# Patient Record
Sex: FEMALE | Race: WHITE | URBAN - METROPOLITAN AREA
[De-identification: names, ages, dates, MRNs, and addresses within clinical notes are randomized per-mention and may not be internally consistent; named-entity substitution may affect disease eponyms.]

---

## 2021-01-27 ENCOUNTER — TELEPHONE ENCOUNTER (OUTPATIENT)
Dept: URBAN - METROPOLITAN AREA CLINIC 114 | Facility: CLINIC | Age: 51
End: 2021-01-27

## 2021-01-28 ENCOUNTER — OFFICE VISIT (OUTPATIENT)
Dept: URBAN - METROPOLITAN AREA CLINIC 114 | Facility: CLINIC | Age: 51
End: 2021-01-28
Payer: COMMERCIAL

## 2021-01-28 DIAGNOSIS — Z98.890 HISTORY OF BILLROTH I OPERATION: ICD-10-CM

## 2021-01-28 DIAGNOSIS — Z87.11 PERSONAL HISTORY OF PEPTIC ULCER DISEASE: ICD-10-CM

## 2021-01-28 DIAGNOSIS — K21.00 GASTROESOPHAGEAL REFLUX DISEASE WITH ESOPHAGITIS WITHOUT HEMORRHAGE: ICD-10-CM

## 2021-01-28 DIAGNOSIS — R19.7 DIARRHEA, UNSPECIFIED TYPE: ICD-10-CM

## 2021-01-28 DIAGNOSIS — K83.8 DILATED CBD, ACQUIRED: ICD-10-CM

## 2021-01-28 DIAGNOSIS — K63.89 SMALL INTESTINAL BACTERIAL OVERGROWTH (SIBO): ICD-10-CM

## 2021-01-28 PROCEDURE — G9902 PT SCRN TBCO AND ID AS USER: HCPCS | Performed by: INTERNAL MEDICINE

## 2021-01-28 PROCEDURE — G8483 FLU IMM NO ADMIN DOC REA: HCPCS | Performed by: INTERNAL MEDICINE

## 2021-01-28 PROCEDURE — G8420 CALC BMI NORM PARAMETERS: HCPCS | Performed by: INTERNAL MEDICINE

## 2021-01-28 PROCEDURE — G9906 PT RECV TBCO CESS INTERV: HCPCS | Performed by: INTERNAL MEDICINE

## 2021-01-28 PROCEDURE — 4004F PT TOBACCO SCREEN RCVD TLK: CPT | Performed by: INTERNAL MEDICINE

## 2021-01-28 PROCEDURE — 3017F COLORECTAL CA SCREEN DOC REV: CPT | Performed by: INTERNAL MEDICINE

## 2021-01-28 PROCEDURE — 99214 OFFICE O/P EST MOD 30 MIN: CPT | Performed by: INTERNAL MEDICINE

## 2021-01-28 RX ORDER — RIFAXIMIN 550 MG/1
1 TABLET TABLET ORAL THREE TIMES A DAY
Qty: 42 TABLET | Refills: 0 | OUTPATIENT
Start: 2021-01-28 | End: 2021-02-11

## 2021-01-28 RX ORDER — SUCRALFATE 1 G/1
1 TABLET ON AN EMPTY STOMACH TABLET ORAL TWICE A DAY
Qty: 180 TABLET | Refills: 3
Start: 2017-08-30 | End: 2018-08-25

## 2021-01-28 RX ORDER — PANTOPRAZOLE SODIUM 40 MG/1
TAKE 1 TABLET BY MOUTH  TWICE A DAY TABLET, DELAYED RELEASE ORAL
Qty: 180 | Refills: 2 | Status: ACTIVE | COMMUNITY
Start: 2019-02-23

## 2021-01-28 RX ORDER — SUCRALFATE 1 G/1
TAKE 1 TABLET BY MOUTH  PRIOR TO MEALS TABLET ORAL
Qty: 270 | Refills: 3 | Status: ON HOLD | COMMUNITY
Start: 2019-02-23

## 2021-01-28 RX ORDER — METOCLOPRAMIDE HYDROCHLORIDE 5 MG/1
TID PRN FOR NAUSEA AND VOMITING TABLET ORAL TWICE A DAY
Qty: 60 TABLET | Refills: 0
Start: 2017-06-22

## 2021-01-28 RX ORDER — PANTOPRAZOLE SODIUM 40 MG/1
1 TABLET TABLET, DELAYED RELEASE ORAL ONCE A DAY
Qty: 90 TABLET | Refills: 4
Start: 2019-02-23

## 2021-01-28 RX ORDER — METOCLOPRAMIDE HYDROCHLORIDE 5 MG/1
TID PRN FOR NAUSEA AND VOMITING TABLET ORAL
Qty: 60 | Refills: 0 | Status: ACTIVE | COMMUNITY
Start: 2017-06-22

## 2021-01-28 RX ORDER — ONDANSETRON HYDROCHLORIDE 8 MG/1
HALF TO 1 TABLET AS NEEDED TABLET, FILM COATED ORAL ONCE A DAY
Qty: 28 TABLET | Refills: 1 | OUTPATIENT

## 2021-01-28 RX ORDER — SUCRALFATE 1 G/1
1 TAB PRIOR TO MEALS THREE TIMES A DAY TABLET ORAL
Qty: 270 | Refills: 4 | Status: ACTIVE | COMMUNITY
Start: 2017-08-30

## 2021-02-05 LAB
A/G RATIO: 1.7
ALBUMIN: 4.5
ALKALINE PHOSPHATASE: 71
ALT (SGPT): 8
AST (SGOT): 20
BASO (ABSOLUTE): 0.1
BASOS: 1
BILIRUBIN, TOTAL: <0.2
BUN/CREATININE RATIO: 28
BUN: 29
CALCIUM: 9.4
CARBON DIOXIDE, TOTAL: 23
CHLORIDE: 99
CHOLESTEROL, TOTAL: 160
COMMENT:: (no result)
CREATININE: 1.05
EGFR IF AFRICN AM: 71
EGFR IF NONAFRICN AM: 61
EOS (ABSOLUTE): 0.1
EOS: 1
FOLATE (FOLIC ACID), SERUM: 18.1
GLOBULIN, TOTAL: 2.6
GLUCOSE: 107
HDL CHOLESTEROL: 61
HEMATOCRIT: 39.3
HEMATOLOGY COMMENTS:: (no result)
HEMOGLOBIN: 11.2
IMMATURE CELLS: (no result)
IMMATURE GRANS (ABS): 0
IMMATURE GRANULOCYTES: 0
LDL CHOL CALC (NIH): 75
LYMPHS (ABSOLUTE): 1.5
LYMPHS: 19
MCH: 20.6
MCHC: 28.5
MCV: 72
MONOCYTES(ABSOLUTE): 0.9
MONOCYTES: 11
NEUTROPHILS (ABSOLUTE): 5.3
NEUTROPHILS: 68
NRBC: (no result)
PLATELETS: 659
POTASSIUM: 3.9
PROTEIN, TOTAL: 7.1
RBC: 5.44
RDW: 16.5
SODIUM: 138
T4,FREE(DIRECT): 1.17
TRIGLYCERIDES: 142
TSH: 0.87
VIT. B1, WHOLE BLOOD: 126.5
VITAMIN B12: 996
VITAMIN D, 25-HYDROXY: 26.9
VLDL CHOLESTEROL CAL: 24
WBC: 7.9

## 2021-02-12 ENCOUNTER — TELEPHONE ENCOUNTER (OUTPATIENT)
Dept: URBAN - METROPOLITAN AREA CLINIC 82 | Facility: CLINIC | Age: 51
End: 2021-02-12

## 2021-03-03 ENCOUNTER — TELEPHONE ENCOUNTER (OUTPATIENT)
Dept: URBAN - METROPOLITAN AREA CLINIC 78 | Facility: CLINIC | Age: 51
End: 2021-03-03

## 2021-03-03 RX ORDER — AMOXICILLIN AND CLAVULANATE POTASSIUM 500; 125 MG/1; 1/1
1 TABLET TABLET, FILM COATED ORAL
Qty: 30 TABLET | Refills: 0 | OUTPATIENT
Start: 2021-03-04 | End: 2021-03-14

## 2021-03-04 ENCOUNTER — WEB ENCOUNTER (OUTPATIENT)
Dept: URBAN - METROPOLITAN AREA CLINIC 82 | Facility: CLINIC | Age: 51
End: 2021-03-04

## 2021-03-04 ENCOUNTER — TELEPHONE ENCOUNTER (OUTPATIENT)
Dept: URBAN - METROPOLITAN AREA CLINIC 92 | Facility: CLINIC | Age: 51
End: 2021-03-04

## 2021-03-04 RX ORDER — AMOXICILLIN AND CLAVULANATE POTASSIUM 500; 125 MG/1; 1/1
1 TABLET TABLET, FILM COATED ORAL
Qty: 30 TABLET | Refills: 0 | OUTPATIENT
Start: 2021-03-04 | End: 2021-03-14

## 2021-04-14 ENCOUNTER — OFFICE VISIT (OUTPATIENT)
Dept: URBAN - METROPOLITAN AREA CLINIC 115 | Facility: CLINIC | Age: 51
End: 2021-04-14

## 2021-07-19 ENCOUNTER — OFFICE VISIT (OUTPATIENT)
Dept: URBAN - METROPOLITAN AREA CLINIC 82 | Facility: CLINIC | Age: 51
End: 2021-07-19

## 2021-09-22 ENCOUNTER — OFFICE VISIT (OUTPATIENT)
Dept: URBAN - METROPOLITAN AREA CLINIC 115 | Facility: CLINIC | Age: 51
End: 2021-09-22

## 2021-10-29 ENCOUNTER — WEB ENCOUNTER (OUTPATIENT)
Dept: URBAN - METROPOLITAN AREA CLINIC 82 | Facility: CLINIC | Age: 51
End: 2021-10-29

## 2021-10-29 ENCOUNTER — OFFICE VISIT (OUTPATIENT)
Dept: URBAN - METROPOLITAN AREA CLINIC 82 | Facility: CLINIC | Age: 51
End: 2021-10-29
Payer: COMMERCIAL

## 2021-10-29 ENCOUNTER — LAB OUTSIDE AN ENCOUNTER (OUTPATIENT)
Dept: URBAN - METROPOLITAN AREA CLINIC 82 | Facility: CLINIC | Age: 51
End: 2021-10-29

## 2021-10-29 VITALS
DIASTOLIC BLOOD PRESSURE: 78 MMHG | SYSTOLIC BLOOD PRESSURE: 138 MMHG | TEMPERATURE: 96.8 F | HEART RATE: 92 BPM | WEIGHT: 101 LBS | BODY MASS INDEX: 15.85 KG/M2 | HEIGHT: 67 IN

## 2021-10-29 DIAGNOSIS — K63.89 SMALL INTESTINAL BACTERIAL OVERGROWTH (SIBO): ICD-10-CM

## 2021-10-29 DIAGNOSIS — R19.7 DIARRHEA: ICD-10-CM

## 2021-10-29 DIAGNOSIS — K83.8 DILATED CBD, ACQUIRED: ICD-10-CM

## 2021-10-29 DIAGNOSIS — K21.00 GASTROESOPHAGEAL REFLUX DISEASE WITH ESOPHAGITIS WITHOUT HEMORRHAGE: ICD-10-CM

## 2021-10-29 PROBLEM — 266998003: Status: ACTIVE | Noted: 2021-01-28

## 2021-10-29 PROBLEM — 1082431000119108: Status: ACTIVE | Noted: 2021-01-28

## 2021-10-29 PROBLEM — 82031000119102: Status: ACTIVE | Noted: 2021-01-28

## 2021-10-29 PROCEDURE — 99214 OFFICE O/P EST MOD 30 MIN: CPT | Performed by: INTERNAL MEDICINE

## 2021-10-29 RX ORDER — SODIUM SULFATE, MAGNESIUM SULFATE, AND POTASSIUM CHLORIDE 17.75; 2.7; 2.25 G/1; G/1; G/1
12 TABLETS TABLET ORAL
Qty: 24 TABLETS | Refills: 0 | OUTPATIENT
Start: 2021-10-29 | End: 2021-10-30

## 2021-10-29 RX ORDER — ONDANSETRON HYDROCHLORIDE 8 MG/1
HALF TO 1 TABLET AS NEEDED TABLET, FILM COATED ORAL ONCE A DAY
Qty: 28 TABLET | Refills: 1 | OUTPATIENT

## 2021-10-29 RX ORDER — PANTOPRAZOLE SODIUM 40 MG/1
1 TABLET TABLET, DELAYED RELEASE ORAL ONCE A DAY
Qty: 90 TABLET | Refills: 4

## 2021-10-29 RX ORDER — METOCLOPRAMIDE HYDROCHLORIDE 5 MG/1
TID PRN FOR NAUSEA AND VOMITING TABLET ORAL TWICE A DAY
Qty: 60 TABLET | Refills: 0 | Status: ACTIVE | COMMUNITY
Start: 2017-06-22

## 2021-10-29 RX ORDER — SUCRALFATE 1 G/1
TAKE 1 TABLET BY MOUTH  PRIOR TO MEALS TABLET ORAL
Qty: 270 | Refills: 3 | Status: ON HOLD | COMMUNITY
Start: 2019-02-23

## 2021-10-29 RX ORDER — PANTOPRAZOLE SODIUM 40 MG/1
1 TABLET TABLET, DELAYED RELEASE ORAL ONCE A DAY
Qty: 90 TABLET | Refills: 4 | Status: ACTIVE | COMMUNITY
Start: 2019-02-23

## 2021-10-29 RX ORDER — METOCLOPRAMIDE HYDROCHLORIDE 5 MG/1
TID PRN FOR NAUSEA AND VOMITING TABLET ORAL TWICE A DAY
Qty: 60 TABLET | Refills: 0

## 2021-10-29 RX ORDER — ONDANSETRON HYDROCHLORIDE 8 MG/1
HALF TO 1 TABLET AS NEEDED TABLET, FILM COATED ORAL ONCE A DAY
Qty: 28 TABLET | Refills: 1 | Status: ACTIVE | COMMUNITY

## 2021-10-29 RX ORDER — RIFAXIMIN 550 MG/1
1 TABLET TABLET ORAL THREE TIMES A DAY
Qty: 42 TABLET | Refills: 0 | OUTPATIENT
Start: 2021-10-29 | End: 2021-11-11

## 2021-10-29 NOTE — HPI-TODAY'S VISIT:
Ms. Apple is here for follow-up. Since she was last seen in earlier this year she reports worsening of her diarrhea. She has not notified as-patient stated that she has been using over-the-counter Imodium. And Lomotil prescriptions are has given. She reports some more weight loss. It was unintentional weight loss. She reports episodes of some nausea and also but denies any vomiting. She thinks her upper GI symptoms are better controlled compared to the lower GI symptoms. Diarrhea anywhere between 5 to 6-7 times per day sometimes nocturnal diarrhea she is afraid of traveling is having social events and also had episodes of incontinence while she was sleep. In the past Xifaxan was prescribed however it did not however insurance did not approve it however the same is Cipro Flagyl has helped her for symptoms went about 2 months. And denies any new medications. Patient stated she had tried some keto diet which improves her bloating but did not not not much for diarrhea improvement.

## 2021-10-31 LAB
A/G RATIO: 1.5
ALBUMIN: 4
ALKALINE PHOSPHATASE: 97
ALT (SGPT): 7
AST (SGOT): 14
BASO (ABSOLUTE): 0.1
BASOS: 2
BILIRUBIN, TOTAL: <0.2
BUN/CREATININE RATIO: 22
BUN: 16
C-REACTIVE PROTEIN, QUANT: 1
CALCIUM: 9.5
CARBON DIOXIDE, TOTAL: 21
CHLORIDE: 108
CREATININE: 0.74
DEAMIDATED GLIADIN ABS, IGA: 4
DEAMIDATED GLIADIN ABS, IGG: 1
EGFR IF AFRICN AM: 107
EGFR IF NONAFRICN AM: 93
ENDOMYSIAL ANTIBODY IGA: NEGATIVE
EOS (ABSOLUTE): 0.3
EOS: 4
GLOBULIN, TOTAL: 2.7
GLUCOSE: 78
HEMATOCRIT: 30.2
HEMATOLOGY COMMENTS:: (no result)
HEMOGLOBIN: 8
IMMATURE CELLS: (no result)
IMMATURE GRANS (ABS): 0
IMMATURE GRANULOCYTES: 0
IMMUNOGLOBULIN A, QN, SERUM: 197
LYMPHS (ABSOLUTE): 1.8
LYMPHS: 25
MCH: 18.3
MCHC: 26.5
MCV: 69
MONOCYTES(ABSOLUTE): 0.5
MONOCYTES: 7
NEUTROPHILS (ABSOLUTE): 4.5
NEUTROPHILS: 62
NRBC: (no result)
PLATELETS: 677
POTASSIUM: 5.5
PROTEIN, TOTAL: 6.7
RBC: 4.37
RDW: 16.6
SODIUM: 145
T-TRANSGLUTAMINASE (TTG) IGA: <2
T-TRANSGLUTAMINASE (TTG) IGG: <2
T4,FREE(DIRECT): 0.95
TSH: 0.83
WBC: 7.2

## 2021-11-01 ENCOUNTER — WEB ENCOUNTER (OUTPATIENT)
Dept: URBAN - METROPOLITAN AREA CLINIC 82 | Facility: CLINIC | Age: 51
End: 2021-11-01

## 2021-11-01 RX ORDER — SODIUM SULFATE, MAGNESIUM SULFATE, AND POTASSIUM CHLORIDE 17.75; 2.7; 2.25 G/1; G/1; G/1
12 TABLETS TABLET ORAL
Qty: 24 TABLETS | Refills: 0 | OUTPATIENT
Start: 2021-11-01 | End: 2021-11-02

## 2021-11-01 RX ORDER — PANTOPRAZOLE SODIUM 40 MG/1
1 TABLET TABLET, DELAYED RELEASE ORAL ONCE A DAY
Qty: 90 TABLET | Refills: 4 | Status: ACTIVE | COMMUNITY

## 2021-11-01 RX ORDER — RIFAXIMIN 550 MG/1
1 TABLET TABLET ORAL THREE TIMES A DAY
Qty: 42 TABLET | Refills: 0 | Status: ACTIVE | COMMUNITY
Start: 2021-10-29 | End: 2021-11-11

## 2021-11-01 RX ORDER — SUCRALFATE 1 G/1
TAKE 1 TABLET BY MOUTH  PRIOR TO MEALS TABLET ORAL
Qty: 270 | Refills: 3 | Status: ON HOLD | COMMUNITY
Start: 2019-02-23

## 2021-11-01 RX ORDER — ONDANSETRON HYDROCHLORIDE 8 MG/1
HALF TO 1 TABLET AS NEEDED TABLET, FILM COATED ORAL ONCE A DAY
Qty: 28 TABLET | Refills: 1 | Status: ACTIVE | COMMUNITY

## 2021-11-01 RX ORDER — METOCLOPRAMIDE HYDROCHLORIDE 5 MG/1
TID PRN FOR NAUSEA AND VOMITING TABLET ORAL TWICE A DAY
Qty: 60 TABLET | Refills: 0 | Status: ACTIVE | COMMUNITY

## 2021-11-03 ENCOUNTER — TELEPHONE ENCOUNTER (OUTPATIENT)
Dept: URBAN - METROPOLITAN AREA CLINIC 78 | Facility: CLINIC | Age: 51
End: 2021-11-03

## 2021-11-03 RX ORDER — IRON SUCROSE 20 MG/ML
AS DIRECTED INJECTION, SOLUTION INTRAVENOUS
Qty: 10 | Refills: 0 | OUTPATIENT
Start: 2021-11-04

## 2021-11-04 ENCOUNTER — CLAIMS CREATED FROM THE CLAIM WINDOW (OUTPATIENT)
Dept: URBAN - METROPOLITAN AREA CLINIC 4 | Facility: CLINIC | Age: 51
End: 2021-11-04
Payer: COMMERCIAL

## 2021-11-04 ENCOUNTER — OFFICE VISIT (OUTPATIENT)
Dept: URBAN - METROPOLITAN AREA SURGERY CENTER 13 | Facility: SURGERY CENTER | Age: 51
End: 2021-11-04
Payer: COMMERCIAL

## 2021-11-04 ENCOUNTER — LAB OUTSIDE AN ENCOUNTER (OUTPATIENT)
Dept: URBAN - METROPOLITAN AREA CLINIC 78 | Facility: CLINIC | Age: 51
End: 2021-11-04

## 2021-11-04 DIAGNOSIS — K52.831 CC (COLLAGENOUS COLITIS): ICD-10-CM

## 2021-11-04 DIAGNOSIS — K64.1 BLEEDING GRADE II HEMORRHOIDS: ICD-10-CM

## 2021-11-04 DIAGNOSIS — K52.831 COLLAGENOUS COLITIS: ICD-10-CM

## 2021-11-04 DIAGNOSIS — R19.7 ACUTE DIARRHEA: ICD-10-CM

## 2021-11-04 PROCEDURE — 88342 IMHCHEM/IMCYTCHM 1ST ANTB: CPT | Performed by: PATHOLOGY

## 2021-11-04 PROCEDURE — 88305 TISSUE EXAM BY PATHOLOGIST: CPT | Performed by: PATHOLOGY

## 2021-11-04 PROCEDURE — 45380 COLONOSCOPY AND BIOPSY: CPT | Performed by: INTERNAL MEDICINE

## 2021-11-04 PROCEDURE — G8907 PT DOC NO EVENTS ON DISCHARG: HCPCS | Performed by: INTERNAL MEDICINE

## 2021-11-04 PROCEDURE — 88313 SPECIAL STAINS GROUP 2: CPT | Performed by: PATHOLOGY

## 2021-11-04 PROCEDURE — 46221 LIGATION OF HEMORRHOID(S): CPT | Performed by: INTERNAL MEDICINE

## 2021-11-16 ENCOUNTER — TELEPHONE ENCOUNTER (OUTPATIENT)
Dept: URBAN - METROPOLITAN AREA CLINIC 78 | Facility: CLINIC | Age: 51
End: 2021-11-16

## 2021-11-29 ENCOUNTER — DASHBOARD ENCOUNTERS (OUTPATIENT)
Age: 51
End: 2021-11-29

## 2021-11-30 ENCOUNTER — OFFICE VISIT (OUTPATIENT)
Dept: URBAN - METROPOLITAN AREA TELEHEALTH 2 | Facility: TELEHEALTH | Age: 51
End: 2021-11-30
Payer: COMMERCIAL

## 2021-11-30 DIAGNOSIS — K52.831 COLLAGENOUS COLITIS: ICD-10-CM

## 2021-11-30 DIAGNOSIS — D50.8 ACQUIRED IRON DEFICIENCY ANEMIA DUE TO DECREASED ABSORPTION: ICD-10-CM

## 2021-11-30 DIAGNOSIS — K63.89 SMALL INTESTINAL BACTERIAL OVERGROWTH (SIBO): ICD-10-CM

## 2021-11-30 PROBLEM — 19311003: Status: ACTIVE | Noted: 2021-11-30

## 2021-11-30 PROBLEM — 724556004: Status: ACTIVE | Noted: 2021-11-04

## 2021-11-30 PROBLEM — 446081009: Status: ACTIVE | Noted: 2021-01-28

## 2021-11-30 PROCEDURE — 99213 OFFICE O/P EST LOW 20 MIN: CPT | Performed by: INTERNAL MEDICINE

## 2021-11-30 RX ORDER — SUCRALFATE 1 G/1
TAKE 1 TABLET BY MOUTH  PRIOR TO MEALS TABLET ORAL
Qty: 270 | Refills: 3 | COMMUNITY
Start: 2019-02-23

## 2021-11-30 RX ORDER — PANTOPRAZOLE SODIUM 40 MG/1
1 TABLET TABLET, DELAYED RELEASE ORAL ONCE A DAY
Qty: 90 TABLET | Refills: 4 | Status: ACTIVE | COMMUNITY

## 2021-11-30 RX ORDER — IRON SUCROSE 20 MG/ML
AS DIRECTED INJECTION, SOLUTION INTRAVENOUS
Qty: 10 | Refills: 0 | OUTPATIENT

## 2021-11-30 RX ORDER — ONDANSETRON HYDROCHLORIDE 8 MG/1
HALF TO 1 TABLET AS NEEDED TABLET, FILM COATED ORAL ONCE A DAY
Qty: 28 TABLET | Refills: 1 | Status: ACTIVE | COMMUNITY

## 2021-11-30 RX ORDER — IRON SUCROSE 20 MG/ML
AS DIRECTED INJECTION, SOLUTION INTRAVENOUS
Qty: 10 | Refills: 0 | Status: ACTIVE | COMMUNITY
Start: 2021-11-04

## 2021-11-30 RX ORDER — METOCLOPRAMIDE HYDROCHLORIDE 5 MG/1
TID PRN FOR NAUSEA AND VOMITING TABLET ORAL TWICE A DAY
Qty: 60 TABLET | Refills: 0 | Status: ACTIVE | COMMUNITY

## 2021-11-30 NOTE — HPI-TODAY'S VISIT:
Ms. Apple is here for follow-up. Since she was last seen in earlier this year she reports worsening of her diarrhea. She has not notified as-patient stated that she has been using over-the-counter Imodium. And Lomotil prescriptions are has given. She reports some more weight loss. It was unintentional weight loss. She reports episodes of some nausea and also but denies any vomiting. She thinks her upper GI symptoms are better controlled compared to the lower GI symptoms. Diarrhea anywhere between 5 to 6-7 times per day sometimes nocturnal diarrhea she is afraid of traveling is having social events and also had episodes of incontinence while she was sleep. In the past Xifaxan was prescribed however it did not however insurance did not approve it however the same is Cipro Flagyl has helped her for symptoms went about 2 months. And denies any new medications. Patient stated she had tried some keto diet which improves her bloating but did not not not much for diarrhea improvement.  11/30/21 diarrhea has improved since she was started on budesonide 9 mg qday. Pt reports 2-3 bm.day. denies any nocturnal stools, incontinence . Energy levels are better. Pt denies any more weight loss. She is on PPI daily given the hx of PUD. Labs suggestive of iron deficiency anemia.

## 2021-12-28 ENCOUNTER — CLAIMS CREATED FROM THE CLAIM WINDOW (OUTPATIENT)
Dept: URBAN - METROPOLITAN AREA CLINIC 4 | Facility: CLINIC | Age: 51
End: 2021-12-28
Payer: COMMERCIAL

## 2021-12-28 ENCOUNTER — OFFICE VISIT (OUTPATIENT)
Dept: URBAN - METROPOLITAN AREA SURGERY CENTER 13 | Facility: SURGERY CENTER | Age: 51
End: 2021-12-28
Payer: COMMERCIAL

## 2021-12-28 DIAGNOSIS — K25.7: ICD-10-CM

## 2021-12-28 DIAGNOSIS — K29.81 DUODENITIS WITH BLEEDING: ICD-10-CM

## 2021-12-28 DIAGNOSIS — K25.7 CHRONIC GASTRIC ULCER WITHOUT HEMORRHAGE OR PERFORATION: ICD-10-CM

## 2021-12-28 DIAGNOSIS — R10.13 ABDOMINAL DISCOMFORT, EPIGASTRIC: ICD-10-CM

## 2021-12-28 DIAGNOSIS — K22.89 ESOPHAGEAL BLEEDING: ICD-10-CM

## 2021-12-28 DIAGNOSIS — K29.80 ACUTE DUODENITIS: ICD-10-CM

## 2021-12-28 PROCEDURE — 88305 TISSUE EXAM BY PATHOLOGIST: CPT | Performed by: PATHOLOGY

## 2021-12-28 PROCEDURE — G8907 PT DOC NO EVENTS ON DISCHARG: HCPCS | Performed by: INTERNAL MEDICINE

## 2021-12-28 PROCEDURE — 88341 IMHCHEM/IMCYTCHM EA ADD ANTB: CPT | Performed by: PATHOLOGY

## 2021-12-28 PROCEDURE — 88342 IMHCHEM/IMCYTCHM 1ST ANTB: CPT | Performed by: PATHOLOGY

## 2021-12-28 PROCEDURE — 43239 EGD BIOPSY SINGLE/MULTIPLE: CPT | Performed by: INTERNAL MEDICINE

## 2022-01-27 ENCOUNTER — TELEPHONE ENCOUNTER (OUTPATIENT)
Dept: URBAN - METROPOLITAN AREA CLINIC 92 | Facility: CLINIC | Age: 52
End: 2022-01-27

## 2022-01-27 RX ORDER — METOCLOPRAMIDE HYDROCHLORIDE 15 MG/.07ML
1 SPRAY 30 MINUTES BEFORE MEALS AND AT BEDTIME IN ONE NOSTRIL SPRAY NASAL
Qty: 3 VIAL | Refills: 1 | OUTPATIENT
Start: 2022-01-27